# Patient Record
Sex: MALE | Race: BLACK OR AFRICAN AMERICAN | Employment: FULL TIME | URBAN - METROPOLITAN AREA
[De-identification: names, ages, dates, MRNs, and addresses within clinical notes are randomized per-mention and may not be internally consistent; named-entity substitution may affect disease eponyms.]

---

## 2017-01-02 ENCOUNTER — OFFICE VISIT (OUTPATIENT)
Dept: FAMILY MEDICINE CLINIC | Facility: CLINIC | Age: 52
End: 2017-01-02

## 2017-01-02 VITALS
SYSTOLIC BLOOD PRESSURE: 170 MMHG | HEART RATE: 86 BPM | DIASTOLIC BLOOD PRESSURE: 90 MMHG | TEMPERATURE: 98 F | OXYGEN SATURATION: 98 % | RESPIRATION RATE: 18 BRPM

## 2017-01-02 DIAGNOSIS — J02.0 STREP PHARYNGITIS: Primary | ICD-10-CM

## 2017-01-02 DIAGNOSIS — J02.9 SORE THROAT: ICD-10-CM

## 2017-01-02 DIAGNOSIS — R03.0 ELEVATED BP WITHOUT DIAGNOSIS OF HYPERTENSION: ICD-10-CM

## 2017-01-02 LAB
CONTROL LINE PRESENT WITH A CLEAR BACKGROUND (YES/NO): YES YES/NO
STREP GRP A CUL-SCR: POSITIVE

## 2017-01-02 PROCEDURE — 87880 STREP A ASSAY W/OPTIC: CPT | Performed by: NURSE PRACTITIONER

## 2017-01-02 PROCEDURE — 99203 OFFICE O/P NEW LOW 30 MIN: CPT | Performed by: NURSE PRACTITIONER

## 2017-01-02 RX ORDER — AMOXICILLIN 500 MG/1
CAPSULE ORAL
Qty: 30 CAPSULE | Refills: 0 | Status: SHIPPED | OUTPATIENT
Start: 2017-01-02

## 2017-01-02 RX ORDER — AMOXICILLIN 500 MG/1
500 CAPSULE ORAL 3 TIMES DAILY
Qty: 30 CAPSULE | Refills: 0 | Status: SHIPPED | OUTPATIENT
Start: 2017-01-02 | End: 2017-01-02

## 2017-01-02 NOTE — PROGRESS NOTES
CHIEF COMPLAINT:   Patient presents with:  Sore Throat: fever, nausea x1, throat pain      HPI:   Jaylene Talavera is a 46year old male presents to clinic with symptoms of sore throat. Patient has had for 3 days. Symptoms have been worsening since onset.   P LYMPH: + anterior cervical  lymphadenopathy. No posterior cervical or occipital lymphadenopathy. Results for Kyung Lewis (MRN [id number]) as of 1/3/2017 12:06     Ref.  Range 1/2/2017 11:40   Control Line Present with a clear background (yes/no) Lates · The antibiotic medication must be taken for the full 10 days, even if you feel better.  This is very important to ensure the infection is treated. It is also important to prevent drug-resistent organisms from developing. If you were given an antibiotic sh The patient/parent indicates understanding of these issues and agrees to the plan. The patient is asked to follow up with their PCP prn.

## 2017-01-03 NOTE — PROGRESS NOTES
Pt informed of elevated b/p reading, states that he usually has elevated reading at his PCP's office, blood pressure rechecked and with slight reduction. Pt denies any treatment for hypertension in the past. Is physically active and works out daily.  States

## (undated) NOTE — MR AVS SNAPSHOT
[facility]  [address] [id number]  [phone]               Thank you for choosing us for your health care visit with Kelley Duron NP.   We are glad to serve you and happy to provide you with this summary of your visi Gargling with warm salt water will also reduce throat pain. Dissolve 1/2 teaspoon of salt in 1 glass of warm water. This may be useful just before meals.   · Soft foods are okay. Avoid salty or spicy foods.   Follow-up care  Follow up with your healthcare p [facility] will allow you to access patient instructions from your recent visit,  view other health information, and more. To sign up or find more information, go to [email or web address] and click on the Sign Up Now link in the [employer] box.      Enter 2 ½ hours per week – spread out over time Use a terrence to keep you motivated   Don’t forget strength training with weights and resistance Set goals and track your progress   You don’t need to join a gym. Home exercises work great.  Put more priority on exe

## (undated) NOTE — Clinical Note
Date: 1/2/2017    Patient Name: Pallavi Gao          To Whom it may concern: This letter has been written at the patient's request. The above patient was seen at the [facility] for treatment of a medical condition.     This patient shoul